# Patient Record
Sex: MALE | Employment: STUDENT | URBAN - METROPOLITAN AREA
[De-identification: names, ages, dates, MRNs, and addresses within clinical notes are randomized per-mention and may not be internally consistent; named-entity substitution may affect disease eponyms.]

---

## 2023-08-28 ENCOUNTER — OFFICE VISIT (OUTPATIENT)
Dept: ORTHOPEDIC SURGERY | Age: 28
End: 2023-08-28

## 2023-08-28 VITALS — HEIGHT: 74 IN | WEIGHT: 195 LBS | BODY MASS INDEX: 25.03 KG/M2

## 2023-08-28 DIAGNOSIS — T84.84XA PAINFUL ORTHOPAEDIC HARDWARE (HCC): ICD-10-CM

## 2023-08-28 DIAGNOSIS — M25.532 LEFT WRIST PAIN: Primary | ICD-10-CM

## 2023-08-28 DIAGNOSIS — M77.8 LEFT WRIST TENDINITIS: ICD-10-CM

## 2023-08-28 NOTE — PROGRESS NOTES
Orthopaedic Hand Clinic Note    Name: Yolanda Langley  YOB: 1995  Gender: male  MRN: [id number]      CC: Patient referred for evaluation of upper extremity pain    HPI: Yolanda Langley is a 34 y.o. male with a chief complaint of. He underwent open reduction with internal fixation of a left distal radius fracture in 2015 at [facility]. The wrist has not been painful until about 3 weeks ago, it started feeling sore and he was unable to bear weight on an extended wrist.  He has no history of injury, but says about 2 weeks ago bruise came up on it. He has no pain with finger motion. .      ROS/Meds/PSH/PMH/FH/SH: I personally reviewed the patients standard intake form. Pertinents are discussed in the HPI    Physical Examination:    Musculoskeletal Exam:  Examination on the left upper extremity demonstrates cap refill < 5 seconds in all fingers, there is a well-healed surgical incision on the volar aspect of his wrist.  There is mild fullness over the volar aspect of the wrist.  The distal radial aspect of the plate is slightly prominent and there is mild tenderness to palpation here. The most proximal screw is palpable dorsally and mildly tender to palpation. He is able to perform 80 degrees of wrist flexion and extension. He has pain at the extremes of wrist extension with axial loading. There is no pain or crepitus with compression of the flexor tendons at the distal aspect of the plate and active finger and thumb flexion and extension. No tenderness at the ulnar styloid    Imaging / Electrodiagnostic Tests:     Wrist XR: AP, Lateral, Oblique views     Clinical Indication:  1. Left wrist pain    2. Left wrist tendinitis    3. Painful orthopaedic hardware [facility])           Report: AP, lateral, oblique x-ray of the left wrist demonstrates status post plate and screw osteosynthesis at the distal radius. Soong 3 plate position.  Volar tilt is approximately neutral. Ulnar styloid nonunion is

## 2023-08-28 NOTE — PROGRESS NOTES
Patient was fitted and instructed on an  Wrist Splint for patients left wrist. Patient is aware the hand slides in the brace with the thumb placed threw the thumb hole. I demonstrated the correct way to tighten the brace straps to allow for a comfortable fit. Patient understood the correct way to wear the brace. Patient read and signed documenting they understand and agree to [facility]'s current DME return policy.